# Patient Record
Sex: FEMALE | Race: WHITE | NOT HISPANIC OR LATINO | Employment: UNEMPLOYED | URBAN - METROPOLITAN AREA
[De-identification: names, ages, dates, MRNs, and addresses within clinical notes are randomized per-mention and may not be internally consistent; named-entity substitution may affect disease eponyms.]

---

## 2024-10-02 ENCOUNTER — APPOINTMENT (OUTPATIENT)
Dept: PRIMARY CARE | Facility: CLINIC | Age: 39
End: 2024-10-02

## 2025-01-13 ENCOUNTER — OFFICE VISIT (OUTPATIENT)
Dept: URGENT CARE | Age: 40
End: 2025-01-13
Payer: COMMERCIAL

## 2025-01-13 VITALS
HEART RATE: 87 BPM | DIASTOLIC BLOOD PRESSURE: 68 MMHG | TEMPERATURE: 98.6 F | SYSTOLIC BLOOD PRESSURE: 110 MMHG | RESPIRATION RATE: 18 BRPM | OXYGEN SATURATION: 97 %

## 2025-01-13 DIAGNOSIS — R05.1 ACUTE COUGH: ICD-10-CM

## 2025-01-13 DIAGNOSIS — J98.8 RESPIRATORY TRACT INFECTION: Primary | ICD-10-CM

## 2025-01-13 PROCEDURE — 99203 OFFICE O/P NEW LOW 30 MIN: CPT

## 2025-01-13 RX ORDER — AZITHROMYCIN 250 MG/1
TABLET, FILM COATED ORAL
Qty: 6 TABLET | Refills: 0 | Status: SHIPPED | OUTPATIENT
Start: 2025-01-13 | End: 2025-01-18

## 2025-01-13 RX ORDER — BENZONATATE 200 MG/1
200 CAPSULE ORAL EVERY 8 HOURS PRN
Qty: 30 CAPSULE | Refills: 0 | Status: SHIPPED | OUTPATIENT
Start: 2025-01-13

## 2025-01-13 ASSESSMENT — PATIENT HEALTH QUESTIONNAIRE - PHQ9
SUM OF ALL RESPONSES TO PHQ9 QUESTIONS 1 AND 2: 0
1. LITTLE INTEREST OR PLEASURE IN DOING THINGS: NOT AT ALL
2. FEELING DOWN, DEPRESSED OR HOPELESS: NOT AT ALL

## 2025-01-15 ASSESSMENT — ENCOUNTER SYMPTOMS
WHEEZING: 1
TROUBLE SWALLOWING: 0
COUGH: 1
SHORTNESS OF BREATH: 0
FEVER: 0
VOMITING: 0

## 2025-01-15 NOTE — PROGRESS NOTES
Subjective   Patient ID: Yola Bolivar is a 39 y.o. female. They present today with a chief complaint of Cough (X2 weeks), Wheezing, and Sinusitis.    HISTORY OF PRESENT ILLNESS:    Patient presents to the clinic for nonproductive cough, upper respiratory congestion, and subjective wheezing. Initially started to feel unwell about 2 weeks ago. Reports coughing fits that have been interfering with sleep. She denies history of asthma or inhaler use.    Past Medical History  Allergies as of 01/13/2025    (No Known Allergies)       Current Outpatient Medications   Medication Instructions    azithromycin (Zithromax) 250 mg tablet Take 2 tabs (500 mg) by mouth today, than 1 daily for 4 days.    benzonatate (TESSALON) 200 mg, oral, Every 8 hours PRN, Do not crush or chew.         Past Medical History:   Diagnosis Date    Other conditions influencing health status     History of vaginal delivery       Past Surgical History:   Procedure Laterality Date    OTHER SURGICAL HISTORY  08/31/2022    Appendectomy laparoscopic        reports that she has never smoked. She has never used smokeless tobacco.    Review of Systems    Review of Systems   Constitutional:  Negative for fever.   HENT:  Positive for congestion. Negative for drooling and trouble swallowing.    Respiratory:  Positive for cough and wheezing. Negative for shortness of breath.    Cardiovascular:  Negative for chest pain.   Gastrointestinal:  Negative for vomiting.   Skin:  Negative for rash.                                 Objective    Vitals:    01/13/25 1950   BP: 110/68   BP Location: Left arm   Patient Position: Sitting   BP Cuff Size: Adult   Pulse: 87   Resp: 18   Temp: 37 °C (98.6 °F)   TempSrc: Oral   SpO2: 97%     Patient's last menstrual period was 01/02/2025.  PHYSICAL EXAMINATION:    Physical Exam  Vitals and nursing note reviewed.   Constitutional:       General: She is not in acute distress.     Appearance: Normal appearance. She is not ill-appearing.    HENT:      Head: Normocephalic and atraumatic.      Right Ear: Tympanic membrane, ear canal and external ear normal.      Left Ear: Tympanic membrane, ear canal and external ear normal.      Nose: Nose normal.      Mouth/Throat:      Mouth: Mucous membranes are moist.      Pharynx: Oropharynx is clear. No oropharyngeal exudate.   Eyes:      General:         Right eye: No discharge.         Left eye: No discharge.      Extraocular Movements: Extraocular movements intact.      Conjunctiva/sclera: Conjunctivae normal.   Cardiovascular:      Rate and Rhythm: Normal rate and regular rhythm.      Heart sounds: Normal heart sounds.   Pulmonary:      Effort: Pulmonary effort is normal. No respiratory distress.      Breath sounds: Normal breath sounds. No stridor. No wheezing, rhonchi or rales.      Comments: + frequent dry cough  Musculoskeletal:      Cervical back: Normal range of motion and neck supple.   Lymphadenopathy:      Cervical: No cervical adenopathy.   Skin:     General: Skin is warm and dry.      Findings: No rash.   Neurological:      General: No focal deficit present.      Mental Status: She is alert and oriented to person, place, and time.   Psychiatric:         Mood and Affect: Mood normal.         Behavior: Behavior normal.        Procedures    No results found for this visit on 01/13/25.    Diagnostic study results (if any) were reviewed by Lauryn Garcia PA-C.    Assessment/Plan   Allergies, medications, history, and pertinent labs/EKGs/Imaging reviewed by Lauryn Garcia PA-C.     Orders and Diagnoses  Diagnoses and all orders for this visit:  Respiratory tract infection  -     benzonatate (Tessalon) 200 mg capsule; Take 1 capsule (200 mg) by mouth every 8 hours if needed for cough (Dry cough). Do not crush or chew.  -     azithromycin (Zithromax) 250 mg tablet; Take 2 tabs (500 mg) by mouth today, than 1 daily for 4 days.  Acute cough  -     benzonatate (Tessalon) 200 mg capsule; Take 1 capsule (200  mg) by mouth every 8 hours if needed for cough (Dry cough). Do not crush or chew.      Medical Admin Record    Given overall well appearance, vital signs, history, and exam as above, there is no indication for further emergent testing/intervention at this time.      I discussed with the patient my clinical thoughts at this time given the above and we had a shared decision-making conversation in a patient-centered decision-making model on how to proceed forward. Pt was instructed on the importance of close follow-up. They were told that an urgent care diagnosis is often a preliminary impression and that definitive care is often not able to be given in the urgent care setting. Pt was educated that close follow-up is essential for good health and good outcomes. Patient was provided with the following instructions:         May trial abx as directed.       May take benzonatate for symptomatic relief of dry cough as needed. Add guaifenesin for mucus clearance if needed.     Cool mist humidifier in room at night while sleeping. Sleep in semi elevated position.    Tea with honey, throat lozenges, vapor rub, voice rest.     Tylenol or ibuprofen for fevers/pain if needed.      Plenty of fluids and rest.      Good hand hygiene.      Follow up with PCP or RTC in the next 7 days if sx fail to show adequate improvement.        Clinical impression as well as limitations of available testing/examination, all discussed with patient. Pt is well at this time in the urgent care. They are comfortable with the present assessment and plan to be discharged home. Discussed with them close outpatient follow up, reassessment, and possible further testing/treatment via their PCP/specialist if symptoms fail to improve; they agree, understand, and are comfortable with this plan. Pt given the opportunity to ask questions prior to discharge and all questions were answered at this time. Via our discussion, the patient was advised of warning signs  and instructions were reviewed. Strict ED precautions were given, acknowledged, and understood. Discussed with the patient/family that it is okay to return to the urgent care at any time for anything. Advised to present to the ED if present condition changes/worsens or if they develop new symptoms at any time after discharge. Also, advised to go to the ED if they cannot establish outpatient follow-up in time frame specified above. Pt verbalized understanding and agreement with plan and instructions. Discussed the need for close follow up with their primary care provider and/or specialist for further testing/treatment/care/consultation in the short time frame as noted above, if needed - they understand these instructions and agree to close follow up for these reasons. Patient discharged home in stable condition.      Follow Up Instructions  No follow-ups on file.    Patient disposition: Home    Electronically signed by Lauryn Garcia PA-C  12:25 PM

## 2025-06-26 ENCOUNTER — OFFICE VISIT (OUTPATIENT)
Dept: URGENT CARE | Age: 40
End: 2025-06-26
Payer: COMMERCIAL

## 2025-06-26 VITALS
OXYGEN SATURATION: 100 % | SYSTOLIC BLOOD PRESSURE: 117 MMHG | TEMPERATURE: 98.5 F | HEART RATE: 96 BPM | RESPIRATION RATE: 18 BRPM | DIASTOLIC BLOOD PRESSURE: 78 MMHG

## 2025-06-26 DIAGNOSIS — B34.9 VIRAL ILLNESS: ICD-10-CM

## 2025-06-26 DIAGNOSIS — B34.8 RHINOVIRUS: Primary | ICD-10-CM

## 2025-06-26 LAB
POC CORONAVIRUS SARS-COV-2 PCR: NEGATIVE
POC HUMAN RHINOVIRUS PCR: POSITIVE
POC INFLUENZA A VIRUS PCR: NEGATIVE
POC INFLUENZA B VIRUS PCR: NEGATIVE
POC RESPIRATORY SYNCYTIAL VIRUS PCR: NEGATIVE

## 2025-06-26 PROCEDURE — 1036F TOBACCO NON-USER: CPT | Performed by: PHYSICIAN ASSISTANT

## 2025-06-26 PROCEDURE — 99213 OFFICE O/P EST LOW 20 MIN: CPT | Performed by: PHYSICIAN ASSISTANT

## 2025-06-26 PROCEDURE — 87631 RESP VIRUS 3-5 TARGETS: CPT | Performed by: PHYSICIAN ASSISTANT

## 2025-06-26 PROCEDURE — 99070 SPECIAL SUPPLIES PHYS/QHP: CPT | Performed by: PHYSICIAN ASSISTANT

## 2025-06-26 RX ORDER — PREDNISONE 20 MG/1
40 TABLET ORAL DAILY
Qty: 10 TABLET | Refills: 0 | Status: SHIPPED | OUTPATIENT
Start: 2025-06-26 | End: 2025-06-26

## 2025-06-26 RX ORDER — PREDNISONE 20 MG/1
40 TABLET ORAL DAILY
Qty: 10 TABLET | Refills: 0 | Status: SHIPPED | OUTPATIENT
Start: 2025-06-26 | End: 2025-07-01

## 2025-06-26 NOTE — PROGRESS NOTES
Subjective   Patient ID: Yola Bolivar is a 40 y.o. female. They present today with a chief complaint of Nasal Congestion, sinus pressure, and Headache.    CC: URI symptoms x 2 days    HPI: Patient presenting for URI symptoms.  Symptoms include runny nose, congestion, and cough.      No chest pain, shortness of breath, abdominal pain, nausea, vomiting.  No fever, chills, myalgias.  No history of clots or clotting disorders.  No lower extremity swelling or pain.    Past Medical History  Allergies as of 06/26/2025    (No Known Allergies)       Prescriptions Prior to Admission[1]     Medical History[2]    Surgical History[3]     reports that she has never smoked. She has never used smokeless tobacco.    Review of Systems  Review of Systems    After reviewing all body systems I have documented pertinent findings above in the history.  All other Systems reviewed and are negative for complaint.  Pertinent positive and negatives are listed in the above HPI.    Objective    Vitals:    06/26/25 0827   BP: 117/78   Pulse: 96   Resp: 18   Temp: 36.9 °C (98.5 °F)   SpO2: 100%     No LMP recorded.  Physical Exam    General: Alert, oriented, and cooperative.  No acute distress. Well developed, well nourished.     Skin: Skin is warm, and dry. No rashes or lesions.    Eyes: Sclera and conjunctivae normal     Ears: TM´s are intact, grey, with mild effusions bilaterally.  No significant erythema.  No hemotympanum or rupture. Bilateral auricle, helix, and tragus without inflammation or erythema.  No mastoid erythema, or tenderness.  No deformities. Right and left canal negative for erythema, or discharge.  Hearing is grossly intact bilaterally    Mouth/Throat: Pharynx is erythematous.  Tonsils are equal in size.  No exudates.  No angioedema of the lips or tongue.  No respiratory compromise, tripoding, drooling, muffled voice,  stridor, or trismus.     Neck: Supple.     Cardiac: Regular rate     Respiratory:  No acute respiratory  distress.  Regular rate of breathing.  No accessory muscle use.  No tripoding.  Lungs are clear bilaterally.    Abdomen: Soft, nontender.    Musculoskeletal: Upper and lower extremities are atraumatic in appearance without deformity, erythema, edema, and atrophy. No crepitus, or tenderness. Compartments are all soft.      Procedures    Point of Care Test & Imaging Results from this visit  Results for orders placed or performed in visit on 06/26/25   POCT SPOTFIRE R/ST Panel Mini w/COVID ([facility]) manually resulted    Collection Time: 06/26/25  8:42 AM    Specimen: Swab   Result Value Ref Range    POC Sars-Cov-2 PCR Negative Negative    POC Respiratory Syncytial Virus PCR Negative Negative    POC Influenza A Virus PCR Negative Negative    POC Influenza B Virus PCR Negative Negative    POC Human Rhinovirus PCR Positive (A) Negative     Imaging  No results found.    Cardiology, Vascular, and Other Imaging  No other imaging results found for the past 2 days      Diagnostic study results (if any) were reviewed by Kervin Gallego PA-C.    Assessment/Plan   Allergies, medications, history, and pertinent labs/EKGs/Imaging reviewed by Kervin Gallego PA-C.     MDM:  Patient presenting for URI type symptoms x 2 days. Patient does not appear toxic. No acute distress or respiratory compromise.  No tripoding. No nasal flaring.  Speaks in complete sentences.  No sinus tenderness, oral lesions, drooling, stridor, or angioedema. Neck is supple without meningeal signs. Lungs clear.  No reported chest tightness or purulent sputum production.  No clinical signs or history to suggest meningitis, Keon´s, peritonsillar abscess, epiglottitis, pneumonia, or asthma.  Given symptom onset/length of illness, a viral etiology is considered the most likely cause. Through shared decision making antibiotics are not warranted, and were not prescribed. Advised over the counter medication with good follow up. Pt discharged home d/t good  condition.  Pt/family instructed to return if symptoms worsen or if new symptoms develop. Patient/family expressed understanding and consented to the above plan. No barriers of communication were apparent.    COVID spot fire: Positive for rhinovirus    Orders and Diagnoses  Diagnoses and all orders for this visit:  Rhinovirus  -     predniSONE (Deltasone) 20 mg tablet; Take 2 tablets (40 mg) by mouth once daily for 5 days.  Viral illness  -     POCT SPOTFIRE R/ST Panel Mini w/COVID ([facility]) manually resulted  -     predniSONE (Deltasone) 20 mg tablet; Take 2 tablets (40 mg) by mouth once daily for 5 days.    Medical Admin Record    Patient disposition: Home    Electronically signed by Kervin Gallego PA-C  8:57 AM         [1] (Not in a hospital admission)   [2]   Past Medical History:  Diagnosis Date    Other conditions influencing health status     History of vaginal delivery   [3]   Past Surgical History:  Procedure Laterality Date    OTHER SURGICAL HISTORY  08/31/2022    Appendectomy laparoscopic